# Patient Record
(demographics unavailable — no encounter records)

---

## 2024-10-30 NOTE — HISTORY OF PRESENT ILLNESS
[FreeTextEntry1] : Current ASM: VPA 250mg/5ml bid   mg qhs well tolerated  Prior tried: LAC  ***10/30/2024*** John Barrow Michele, 29 yo female h/o LGS, nonverbal, DD, seizures h/o status epilepticus,I spoke with Sister Yamileth legal guardian. Family happy with sz control last GTC 2023, still with brief daily sz 10-15. Once off LAC she is walking more, swelling/puffiness arms/legs resolved and drinking water, prev refused. She was prior gaining weight on VPA. stable now. 24hr aEEG pending. Levels were not done.  No new complains.    ***07/18/2024*** Danial Lopez Michele, 29 yo female h/o LGS, nonverbal, DD last seen by Dr Glynn in Feb 2024 via televisit. Recent med adjustments 1.5 weeks ago increased ZNS to 300 mg qhs, d/c LAC 4 days ago, Also on  mg bid. Denies h/o kidney stones, sister concerned that patient drinks around 4 cups of water per day. Current seizure control: Sister denies sz since the increase of ZNS. Prior: Very brief freezing episodes with bluish lips, arms stiffen, no falls. 3-6x/wk, Last GTCS in 2023 while in EMU. Current weight 07/18/2024 127lbs, had steady weigh gain on VPA. Patient's sister reports puffiness in legs feet and forearms for the past 2 weeks, better with walking. Has boom with PCP scheduled in 2 weeks.

## 2024-10-30 NOTE — REASON FOR VISIT
[Home] : at home, [unfilled] , at the time of the visit. [Medical Office: ([facility])___] : at the medical office located in  [Patient] : the patient [FreeTextEntry2] : sisterYamileth legal guardian

## 2024-10-30 NOTE — PHYSICAL EXAM
[FreeTextEntry1] : Exam limited via telehealth: MS: awake, alert, coherent, fluent, comprehension intact, affect stable. oriented CN: EOMI, face symmetrical, grimace, smile symmetrical, eye closure symmetrical, hearing intact grossly.  Motor: in bed unable to assess Coord: deferrred Walking / Sensation deferred. Cardiac/pulmonary/extremity exam deferred via telehealth. None

## 2024-10-30 NOTE — DISCUSSION/SUMMARY
[FreeTextEntry1] : 30 F with LGS, nonverbal, DD, Multiple gene mutations as described below Previously not on any ASM at this time x many years. Szs have recurred, nearly daily brief events, and monthly GTCS prior to ASM initiation. Recent status epilepticus, hospitalization in July 2022 EEGs with left or right frontocentral max spikes, slowing. Seizures poorly localized, possible late left posterior evolution. 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 The HNRNPU gene is associated with autosomal dominant developmental and epileptic encephalopathy, also known as early infantile epileptic encephalopathy ([employer] UID: [id number]). Affected individuals may also present with hypotonia, cognitive and motor impairment, and other anomalies (PMID: 58954611, 33342701). 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 It is expected to result in an absent or disrupted protein product. Loss-of-function variants in HNRNPU are known to be pathogenic (PMID: 44830400, 34795622). 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 The KIF2A gene is associated with autosomal dominant cortical malformations (Med[employer] UID: [id number]). Not all variants present in a gene cause disease. This variant is not present in population databases  A Variant of Uncertain Significance, c.533C>A (p.Fvd348Mkf), was identified in DDC. The DDC gene is associated with autosomal recessive aromatic L-amino acid decarboxylase (AADC) deficiency (Med[employer] UID: [id number]). Not all variants present in a gene cause disease.  A Variant of Uncertain Significance, c.2250C>A (p.Xgw225Llm), was identified in PCDH19. The PCDH19 gene is associated with X-linked developmental and epileptic encephalopathy, also known as early infantile epileptic encephalopathy (Med[employer] UID: 558901). OZPN16-ifphexe EIEE appears to affect only heterozygous females while sparing obligate carrier males (PMID:45065925). Males with somatic mosaicism have been reported to be affected with a similar phenotype to reported females (PMID: 81979701, 86354395, 05329230). Not all variants present in a gene cause disease.  A Variant of Uncertain Significance, c.441G>A (Silent), was identified in ZSWIM6. The ZSWIM6 gene is associated with autosomal dominant acromelic frontonasal dysostosis (AFND) (MedGen UID: 479622) and a neurodevelopmental disorder with movement abnormalities, abnormal gait, and autistic features (MedGen UID: 3033626). Not all variants present in a gene cause disease.  VPA tolerated 250 mg bid, but wt gain a concern per family, with 2022 breakthrough seizure on monotherapy. Zonisamide 300 mg qhs, still daily brief sz but improved as per fam. EEG results pending    Plan: - Continue VPA 250mg bid - Continue  mg qhs. Knows to hydrate. Will consider increase of ZNS and reduction of VPA - Ordered again cbc. cmp, lft and VPA and ZNS levels through levels. will do with PCP, forwarded to  to email - Consulted Dr Chandler for VNS in 2023. Family deferring at the moment -I will reach out to pt when results of  24hr AEEG  avail - RTC 3-4 months with SARA Moreno telemed knows to reach out prn   x time 25min.